# Patient Record
Sex: FEMALE | Race: ASIAN | Employment: FULL TIME | URBAN - METROPOLITAN AREA
[De-identification: names, ages, dates, MRNs, and addresses within clinical notes are randomized per-mention and may not be internally consistent; named-entity substitution may affect disease eponyms.]

---

## 2018-07-02 ENCOUNTER — TRANSFERRED RECORDS (OUTPATIENT)
Dept: HEALTH INFORMATION MANAGEMENT | Facility: CLINIC | Age: 33
End: 2018-07-02

## 2018-07-12 ENCOUNTER — OFFICE VISIT (OUTPATIENT)
Dept: OBGYN | Facility: CLINIC | Age: 33
End: 2018-07-12
Attending: ADVANCED PRACTICE MIDWIFE
Payer: COMMERCIAL

## 2018-07-12 VITALS — SYSTOLIC BLOOD PRESSURE: 109 MMHG | DIASTOLIC BLOOD PRESSURE: 67 MMHG | HEART RATE: 97 BPM | WEIGHT: 151.1 LBS

## 2018-07-12 DIAGNOSIS — Z34.83 ENCOUNTER FOR SUPERVISION OF OTHER NORMAL PREGNANCY IN THIRD TRIMESTER: ICD-10-CM

## 2018-07-12 PROBLEM — Z34.90 SUPERVISION OF NORMAL PREGNANCY: Status: ACTIVE | Noted: 2018-07-12

## 2018-07-12 LAB
ABO + RH BLD: NORMAL
ABO + RH BLD: NORMAL
BASOPHILS # BLD AUTO: 0 10E9/L (ref 0–0.2)
BASOPHILS NFR BLD AUTO: 0 %
BLD GP AB SCN SERPL QL: NORMAL
BLOOD BANK CMNT PATIENT-IMP: NORMAL
DIFFERENTIAL METHOD BLD: ABNORMAL
EOSINOPHIL # BLD AUTO: 0 10E9/L (ref 0–0.7)
EOSINOPHIL NFR BLD AUTO: 0 %
ERYTHROCYTE [DISTWIDTH] IN BLOOD BY AUTOMATED COUNT: 13.5 % (ref 10–15)
HCT VFR BLD AUTO: 34.6 % (ref 35–47)
HGB BLD-MCNC: 11.2 G/DL (ref 11.7–15.7)
LYMPHOCYTES # BLD AUTO: 0.9 10E9/L (ref 0.8–5.3)
LYMPHOCYTES NFR BLD AUTO: 14.8 %
MCH RBC QN AUTO: 28.9 PG (ref 26.5–33)
MCHC RBC AUTO-ENTMCNC: 32.4 G/DL (ref 31.5–36.5)
MCV RBC AUTO: 89 FL (ref 78–100)
MONOCYTES # BLD AUTO: 0.4 10E9/L (ref 0–1.3)
MONOCYTES NFR BLD AUTO: 6.9 %
NEUTROPHILS # BLD AUTO: 4.9 10E9/L (ref 1.6–8.3)
NEUTROPHILS NFR BLD AUTO: 78.3 %
PLATELET # BLD AUTO: 186 10E9/L (ref 150–450)
PLATELET # BLD EST: ABNORMAL 10*3/UL
RBC # BLD AUTO: 3.87 10E12/L (ref 3.8–5.2)
RBC MORPH BLD: NORMAL
SPECIMEN EXP DATE BLD: NORMAL
WBC # BLD AUTO: 6.2 10E9/L (ref 4–11)

## 2018-07-12 PROCEDURE — 36415 COLL VENOUS BLD VENIPUNCTURE: CPT | Performed by: ADVANCED PRACTICE MIDWIFE

## 2018-07-12 PROCEDURE — 87653 STREP B DNA AMP PROBE: CPT | Performed by: ADVANCED PRACTICE MIDWIFE

## 2018-07-12 PROCEDURE — 86901 BLOOD TYPING SEROLOGIC RH(D): CPT | Performed by: ADVANCED PRACTICE MIDWIFE

## 2018-07-12 PROCEDURE — 86850 RBC ANTIBODY SCREEN: CPT | Performed by: ADVANCED PRACTICE MIDWIFE

## 2018-07-12 PROCEDURE — 82306 VITAMIN D 25 HYDROXY: CPT | Performed by: ADVANCED PRACTICE MIDWIFE

## 2018-07-12 PROCEDURE — 86762 RUBELLA ANTIBODY: CPT | Performed by: ADVANCED PRACTICE MIDWIFE

## 2018-07-12 PROCEDURE — 86900 BLOOD TYPING SEROLOGIC ABO: CPT | Performed by: ADVANCED PRACTICE MIDWIFE

## 2018-07-12 PROCEDURE — 85025 COMPLETE CBC W/AUTO DIFF WBC: CPT | Performed by: ADVANCED PRACTICE MIDWIFE

## 2018-07-12 RX ORDER — PRENATAL VIT/IRON FUM/FOLIC AC 27MG-0.8MG
1 TABLET ORAL DAILY
COMMUNITY

## 2018-07-12 NOTE — PROGRESS NOTES
Subjective   33 year old woman presents to clinic for GAETANO.  No LMP recorded. Patient is pregnant.  at 36w3d by Estimated Date of Delivery: Aug 6, 2018 based on 10 week ultrasound. Pt accompanied by , female friend and daughter.     Pt and family recently moved from [place] on . Records available, some in English and some in Finnish. Reviewed records and lab results with patient. Labs (Hgb/Hct/Plts, HIV, Hep B, Hep C, RPR, GCT, pap, chlamydia) within normal limits. Records to be scanned in.  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    Have you traveled during the pregnancy?Yes, If yes explain: moved from [facility]  Have your sexual partner(s) travelled during the pregnancy?Yes, If yes explain: moved from [facility]    - Current Medications  Current Outpatient Prescriptions   Medication Sig Dispense Refill     Prenatal Vit-Fe Fumarate-FA (PRENATAL MULTIVITAMIN PLUS IRON) 27-0.8 MG TABS per tablet Take 1 tablet by mouth daily           - Co-morbids- recurrent pregnancy loss  PERSONAL/SOCIAL HISTORY  Lives with their family.  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  Her job involves light activity .  Additional items: None    Objective  -VS: reviewed and within normal limits   - General appearance: no acute distress, patient is comfortable   NEUROLOGICAL/PSYCHIATRIC   - Orientated x 3   - Mood and affect: normal     EXAM:  /67  Pulse 97  Wt 68.5 kg (151 lb 1.6 oz)   EXAM:  GENERAL:  Pleasant pregnant female, alert, cooperative and well groomed.  SKIN:  Warm and dry, without lesions or rashes  HEAD: Symmetrical features.  MOUTH:  Buccal mucosa pink, moist without lesions.  Teeth in good repair.    NECK:  Thyroid without enlargement and nodules.  Lymph nodes not palpable.   LUNGS:  Clear to auscultation.  BREAST:  deferred   HEART:  RRR without murmur.  ABDOMEN: Soft without masses,  tenderness or organomegaly.  No CVA tenderness.  Uterus palpable at size equal to dates.  No scars noted.. Fetal heart tones present.  MUSCULOSKELETAL:  Full range of motion  EXTREMITIES:  No edema. No significant varicosities.   PELVIC EXAM: Deferred  GC/CHLAMYDIA CULTURE OBTAINED:NO     Assessment/Plan:  33 year old  36w3d weeks of pregnancy with BRANDEN of Aug 6, 2018 by ultrasound.   Outpatient Encounter Prescriptions as of 2018   Medication Sig Dispense Refill     Prenatal Vit-Fe Fumarate-FA (PRENATAL MULTIVITAMIN PLUS IRON) 27-0.8 MG TABS per tablet Take 1 tablet by mouth daily        Orders Placed This Encounter   Procedures     CBC with Platelets Differential     25- OH-Vitamin D     Rubella Antibody IgG Quantitative     MAT FETAL MED CTR REFERRAL-PREGNANCY     ABO/Rh Type and Screen     - Oriented to Practice, types of care, and how to reach a provider.  Pt prefers CNM.  - Patient received 1st trimester new OB education packet complete with aide of The Expectant Family booklet.  - Patient was encouraged to continue prenatal vitamins as tolerated.     - Reviewed use of triage nurse line and contacting the on-call provider after hours for an urgent need such as fever, vagina bleeding, bladder or vaginal infection, rupture of membranes,  or term labor.   - All pt's questions discussed and answered.  Pt verbalized understanding of and agreement to plan of care.     - Pt agreeable to completing ultrasound. MFM referral placed.  - Patient was sent to lab to repeat OB labs including CBC, Vit D, rubella, ABO/Rh.  - GBS collected today   - Needs EOB education reviewed at next visit  - Continue scheduled prenatal care, RTC in 1 week and prn if questions or concerns

## 2018-07-12 NOTE — LETTER
Date:July 16, 2018      Patient was self referred, no letter generated. Do not send.        [facility] Health Information

## 2018-07-12 NOTE — LETTER
2018       RE: Daniel Solares  [address] [facility] [facility] [address]     Dear Colleague,    Thank you for referring your patient, Daniel Solares, to the [facility] at [facility]. Please see a copy of my visit note below.    Subjective   33 year old woman presents to clinic for GAETANO.  No LMP recorded. Patient is pregnant.  at 36w3d by Estimated Date of Delivery: Aug 6, 2018 based on 10 week ultrasound. Pt accompanied by , female friend and daughter.     Pt and family recently moved from [place] on . Records available, some in English and some in Maltese. Reviewed records and lab results with patient. Labs (Hgb/Hct/Plts, HIV, Hep B, Hep C, RPR, GCT, pap, chlamydia) within normal limits. Records to be scanned in.  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    Have you traveled during the pregnancy?Yes, If yes explain: moved from [facility]  Have your sexual partner(s) travelled during the pregnancy?Yes, If yes explain: moved from [facility]    - Current Medications  Current Outpatient Prescriptions   Medication Sig Dispense Refill     Prenatal Vit-Fe Fumarate-FA (PRENATAL MULTIVITAMIN PLUS IRON) 27-0.8 MG TABS per tablet Take 1 tablet by mouth daily           - Co-morbids- recurrent pregnancy loss  PERSONAL/SOCIAL HISTORY  Lives with their family.  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  Her job involves light activity .  Additional items: None    Objective  -VS: reviewed and within normal limits   - General appearance: no acute distress, patient is comfortable   NEUROLOGICAL/PSYCHIATRIC   - Orientated x 3   - Mood and affect: normal     EXAM:  /67  Pulse 97  Wt 68.5 kg (151 lb 1.6 oz)   EXAM:  GENERAL:  Pleasant pregnant female, alert, cooperative and well groomed.  SKIN:  Warm and dry, without lesions or rashes  HEAD: Symmetrical  features.  MOUTH:  Buccal mucosa pink, moist without lesions.  Teeth in good repair.    NECK:  Thyroid without enlargement and nodules.  Lymph nodes not palpable.   LUNGS:  Clear to auscultation.  BREAST:  deferred   HEART:  RRR without murmur.  ABDOMEN: Soft without masses, tenderness or organomegaly.  No CVA tenderness.  Uterus palpable at size equal to dates.  No scars noted.. Fetal heart tones present.  MUSCULOSKELETAL:  Full range of motion  EXTREMITIES:  No edema. No significant varicosities.   PELVIC EXAM: Deferred  GC/CHLAMYDIA CULTURE OBTAINED:NO     Assessment/Plan:  33 year old  36w3d weeks of pregnancy with BRANDEN of Aug 6, 2018 by ultrasound.   Outpatient Encounter Prescriptions as of 2018   Medication Sig Dispense Refill     Prenatal Vit-Fe Fumarate-FA (PRENATAL MULTIVITAMIN PLUS IRON) 27-0.8 MG TABS per tablet Take 1 tablet by mouth daily        Orders Placed This Encounter   Procedures     CBC with Platelets Differential     25- OH-Vitamin D     Rubella Antibody IgG Quantitative     MAT FETAL MED CTR REFERRAL-PREGNANCY     ABO/Rh Type and Screen     - Oriented to Practice, types of care, and how to reach a provider.  Pt prefers CNM.  - Patient received 1st trimester new OB education packet complete with aide of The Expectant Family booklet.  - Patient was encouraged to continue prenatal vitamins as tolerated.     - Reviewed use of triage nurse line and contacting the on-call provider after hours for an urgent need such as fever, vagina bleeding, bladder or vaginal infection, rupture of membranes,  or term labor.   - All pt's questions discussed and answered.  Pt verbalized understanding of and agreement to plan of care.     - Pt agreeable to completing ultrasound. M referral placed.  - Patient was sent to lab to repeat OB labs including CBC, Vit D, rubella, ABO/Rh.  - GBS collected today   - Needs EOB education reviewed at next visit  - Continue scheduled prenatal care, RTC in 1  week and prn if questions or concerns    Again, thank you for allowing me to participate in the care of your patient.      Sincerely,    MARTITA Taylor CNM

## 2018-07-12 NOTE — MR AVS SNAPSHOT
After Visit Summary   7/12/2018    Daniel Solares    MRN: [id number]           Patient Information     Date Of Birth          1985        Visit Information        Provider Department      7/12/2018 10:30 AM Geoff Soto APRN CNM; LANGUAGE [facility]        Today's Diagnoses     Encounter for supervision of other normal pregnancy in third trimester           Follow-ups after your visit        Additional Services     MAT FETAL MED CTR REFERRAL-PREGNANCY       There is no height or weight on file to calculate BMI.    >> Patient may proceed with recommendations for further testing as directed by the Maternal Fetal Medicine Specialist >>    >> If requesting Fetal Echo: MFM will determine appropriate location for exam due to indication.    >> If requesting Lung Maturity Amnio:  If results indicate fetal lung maturity, induction or C/S is recommended within 36 hours.  Please schedule accordingly.     Dear Patient:   Please be aware that coverage of these services is subject to the terms and limitations of your health insurance plan.  Call member services at your health plan with any benefit or coverage questions.      Please bring the following to your appointment:    >>  Any x-rays, CTs or MRIs which have been performed.  Contact the facility where they were done to arrange for  prior to your scheduled appointment.  Any new CT, MRI or other procedures ordered by your specialist must be performed at a [facility] facility or coordinated by your clinic's referral office.  >>  List of current medications   >>  This referral request   >>  Any documents/labs given to you for this referral                  Follow-up notes from your care team     Return in about 1 week (around 7/19/2018) for EOB Visit.      Your next 10 appointments already scheduled     Jul 16, 2018  2:15 PM CDT   NATHAN US COMP with [id number]   [facility]    [address]  [place] [address] [phone]           Wear comfortable clothes and leave your valuables at home.            2018  2:45 PM CDT   Radiology MD with SH NATHAN OBRIEN   [facility] - [facility]    [address]  [place] [address] [phone]           Please arrive at the time given for your first appointment. This visit is used internally to schedule the physician's time during your ultrasound.              Future tests that were ordered for you today     Open Future Orders        Priority Expected Expires Ordered    [facility] Comprehensive Single Routine  2019            Who to contact     Please call your clinic at [phone] to:    Ask questions about your health    Make or cancel appointments    Discuss your medicines    Learn about your test results    Speak to your doctor            Additional Information About Your Visit        MyChart Information     [employer] is an electronic gateway that provides easy, online access to your medical records. With [employer], you can request a clinic appointment, read your test results, renew a prescription or communicate with your care team.     To sign up for [employer] visit the website at [email or web address]   You will be asked to enter the access code listed below, as well as some personal information. Please follow the directions to create your username and password.     Your access code is: [email or web address]  Expires: 10/9/2018  6:30 AM     Your access code will  in 90 days. If you need help or a new code, please contact your [facility] or call [phone] for assistance.        Care EveryWhere ID     This is your Care EveryWhere ID. This could be used by other organizations to access your [place] medical records  [id number]        Your Vitals Were     Pulse                   97             Blood Pressure from Last 3 Encounters:   07/12/18 109/67    Weight from Last 3 Encounters:   07/12/18 68.5 kg (151 lb 1.6 oz)              We Performed the Following     25- OH-Vitamin D     ABO/Rh Type and Screen     CBC with Platelets Differential     Group B strep PCR     MAT FETAL MED CTR REFERRAL-PREGNANCY     Rubella Antibody IgG Quantitative        Primary Care Provider    None Specified       No primary provider on file.        Equal Access to Services     [facility]: Hadii arti schwartzo Jose, waaxda melvin, qaybta kaalmada sophia, nirmal fariaenzomadhav tijerina . So [facility] [phone].    ATENCIÓN: Si habla handy, tiene a renee disposición servicios gratuitos de asistencia lingüística. Llame al [phone].    We comply with applicable federal civil rights laws and [place] laws. We do not discriminate on the basis of race, color, national origin, age, disability, sex, sexual orientation, or gender identity.            Thank you!     Thank you for choosing [facility]  for your care. Our goal is always to provide you with excellent care. Hearing back from our patients is one way we can continue to improve our services. Please take a few minutes to complete the written survey that you may receive in the mail after your visit with us. Thank you!             Your Updated Medication List - Protect others around you: Learn how to safely use, store and throw away your medicines at www.disposemymeds.org.          This list is accurate as of 7/12/18 11:59 PM.  Always use your most recent med list.                   Brand Name Dispense Instructions for use Diagnosis    prenatal multivitamin plus iron 27-0.8 MG Tabs per tablet      Take 1 tablet by mouth daily

## 2018-07-13 LAB
GP B STREP DNA SPEC QL NAA+PROBE: NEGATIVE
RUBV IGG SERPL IA-ACNC: 11 IU/ML
SPECIMEN SOURCE: NORMAL

## 2018-07-16 ENCOUNTER — OFFICE VISIT (OUTPATIENT)
Dept: MATERNAL FETAL MEDICINE | Facility: CLINIC | Age: 33
End: 2018-07-16
Attending: ADVANCED PRACTICE MIDWIFE
Payer: COMMERCIAL

## 2018-07-16 ENCOUNTER — PRE VISIT (OUTPATIENT)
Dept: MATERNAL FETAL MEDICINE | Facility: CLINIC | Age: 33
End: 2018-07-16

## 2018-07-16 ENCOUNTER — HOSPITAL ENCOUNTER (OUTPATIENT)
Dept: ULTRASOUND IMAGING | Facility: CLINIC | Age: 33
Discharge: HOME | End: 2018-07-16
Attending: ADVANCED PRACTICE MIDWIFE | Admitting: ADVANCED PRACTICE MIDWIFE
Payer: COMMERCIAL

## 2018-07-16 DIAGNOSIS — O26.90 PREGNANCY RELATED CONDITION, ANTEPARTUM: ICD-10-CM

## 2018-07-16 DIAGNOSIS — O35.9XX0 SUSPECTED FETAL ABNORMALITY AFFECTING MANAGEMENT OF MOTHER, ANTEPARTUM, SINGLE OR UNSPECIFIED FETUS: Primary | ICD-10-CM

## 2018-07-16 LAB — DEPRECATED CALCIDIOL+CALCIFEROL SERPL-MC: 17 UG/L (ref 20–75)

## 2018-07-16 PROCEDURE — 76811 OB US DETAILED SNGL FETUS: CPT

## 2018-07-16 NOTE — PROGRESS NOTES
Please see ultrasound report under imaging tab for details on ultrasound performed today.    Cheyenne Freeman MD  , OB/GYN  Maternal-Fetal Medicine  [email or web address]  [phone] (Academic office)  [phone] (Pager)

## 2018-07-16 NOTE — MR AVS SNAPSHOT
After Visit Summary   7/16/2018    Daniel Solares    MRN: [id number]           Patient Information     Date Of Birth          1985        Visit Information        Provider Department      7/16/2018 2:45 PM Cheyenne Freeman MD [facility]alth Maternal Fetal Medicine  [place]        Today's Diagnoses     Suspected fetal abnormality affecting management of mother, antepartum, single or unspecified fetus    -  1       Follow-ups after your visit        Your next 10 appointments already scheduled     Jul 18, 2018 10:15 AM CDT   Ech Fetal Complete* with Urmfmusfet   [facility] Maternal Fetal Medicine - [place] ([facility])    [address]  [facility] [address]   [phone]           Please check in at the [place] [facility],  [address], [place] [address].  Park in the Red Ramp for easiest access.  Skyway access to the [place] [employer] [facility] is located on the second level of the ramp.  Please call [phone] if you have questions.            Jul 25, 2018  2:00 PM CDT   Return Obstetrics Extended with MARTITA Ware CNM   [facility] ([facility])    [place] [facility] [address]  [address]  [facility] [id number] [phone]              Future tests that were ordered for you today     Open Future Orders        Priority Expected Expires Ordered    Echo Fetal Complete-Peds Cardiology Routine  7/16/2019 7/16/2018            Who to contact     If you have questions or need follow up information about today's clinic visit or your schedule please contact [facility] directly at [phone].  Normal or non-critical lab and imaging results will be communicated to you by MyChart, letter or phone within 4 business days after the clinic has received the results. If you do not hear from us within 7 days, please contact the clinic through  "MyChart or phone. If you have a critical or abnormal lab result, we will notify you by phone as soon as possible.  Submit refill requests through [employer] or call your pharmacy and they will forward the refill request to us. Please allow 3 business days for your refill to be completed.          Additional Information About Your Visit        Letsmakehart Information     [employer] lets you send messages to your doctor, view your test results, renew your prescriptions, schedule appointments and more. To sign up, go to [email or web address] . Click on \"Log in\" on the left side of the screen, which will take you to the Welcome page. Then click on \"Sign up Now\" on the right side of the page.     You will be asked to enter the access code listed below, as well as some personal information. Please follow the directions to create your username and password.     Your access code is: [email or web address]  Expires: 10/9/2018  6:30 AM     Your access code will  in 90 days. If you need help or a new code, please call your [place] clinic or [phone].        Care EveryWhere ID     This is your Care EveryWhere ID. This could be used by other organizations to access your [place] medical records  [id number]         Blood Pressure from Last 3 Encounters:   18 109/67    Weight from Last 3 Encounters:   18 68.5 kg (151 lb 1.6 oz)               Primary Care Provider    None Specified       No primary provider on file.        Equal Access to Services     [facility] : Hadii arti schwartzo Sonahomy, waaxda luqadaha, qaybta kaalmada kitda, nirmal tijerina . So [facility] [phone].    ATENCIÓN: Si habla español, tiene a renee disposición servicios gratuitos de asistencia lingüística. Llame al [phone].    We comply with applicable federal civil rights laws and [place] laws. We do not discriminate on the basis of race, color, national origin, age, disability, sex, sexual orientation, or gender " identity.            Thank you!     Thank you for choosing [facility]  for your care. Our goal is always to provide you with excellent care. Hearing back from our patients is one way we can continue to improve our services. Please take a few minutes to complete the written survey that you may receive in the mail after your visit with us. Thank you!             Your Updated Medication List - Protect others around you: Learn how to safely use, store and throw away your medicines at www.disposemymeds.org.          This list is accurate as of 7/16/18  3:27 PM.  Always use your most recent med list.                   Brand Name Dispense Instructions for use Diagnosis    prenatal multivitamin plus iron 27-0.8 MG Tabs per tablet      Take 1 tablet by mouth daily

## 2018-07-18 ENCOUNTER — HOSPITAL ENCOUNTER (OUTPATIENT)
Dept: CARDIOLOGY | Facility: CLINIC | Age: 33
Discharge: HOME | End: 2018-07-18
Attending: OBSTETRICS & GYNECOLOGY | Admitting: OBSTETRICS & GYNECOLOGY
Payer: COMMERCIAL

## 2018-07-18 DIAGNOSIS — O35.9XX0 SUSPECTED FETAL ABNORMALITY AFFECTING MANAGEMENT OF MOTHER, ANTEPARTUM, SINGLE OR UNSPECIFIED FETUS: ICD-10-CM

## 2018-07-18 PROBLEM — E55.9 VITAMIN D DEFICIENCY: Status: ACTIVE | Noted: 2018-07-18

## 2018-07-18 PROCEDURE — 76827 ECHO EXAM OF FETAL HEART: CPT

## 2018-07-18 NOTE — PROGRESS NOTES
Fetal Cardiology Consultation    Patient:  Daniel Solares MRN:  [id number]   YOB: 1985 Age:  33 year old   Date of Visit:  2018 PCP:  No primary care provider on file.   BRANDEN: 2018, Alternate BRANDEN Entry EGA: 37w2d weeks     Dear Dr. Freeman:    I had the pleasure of seeing Daniel Solares at the [facility] Fetal Echocardiography Laboratory in [place] on 2018 in consultation for fetal echocardiography results. She presented today accompanied by her partner; today's visit was facilitated through an . 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    Likely normal fetal echocardiogram. Normal fetal cardiac anatomy. Normal fetal intracardiac connections. Normal right and left ventricular size and function. The left common carotid appears to arise from the innominate artery, with normal origin of the left subclavian artery. The aortic isthmus measures borderline-hypoplastic (3.6-4mm), with normal right heart size for gestational age, no AV valve regurgitation, normal prograde arch flow profile.    I reviewed and interpreted the fetal echocardiogram today. I discussed the normal results with Ms. Solares and her partner with an . While these results are normal, it is important to note that fetal echocardiography cannot exclude small atrial or ventricular septal defects, persistent ductus arteriosus, mild coarctation of the aorta, partial anomalous pulmonary venous return, minor anatomic valve anomalies, or coronary artery anomalies.     I discussed results of the study and visit with you.  -- No additional fetal echocardiograms are recommended for this pregnancy.  -- A post- transthoracic echocardiogram is recommended to reevaluate the aortic arch, within 24H of delivery.    Thank you for allowing me to participate in Ms. Solares's care. Please don't hesitate to contact me or  the Fetal Cardiology team at [facility] with any questions or concerns.     I spent a total of 20 minutes face-to-face with MsEdison Sujatha during today's office visit. Over 50% of this time was spent counseling the patient and/or coordinating care regarding the fetal echocardiography results.     Ji Burns  Pediatric Cardiology  [facility]  Phone [phone]

## 2018-07-25 ENCOUNTER — OFFICE VISIT (OUTPATIENT)
Dept: OBGYN | Facility: CLINIC | Age: 33
End: 2018-07-25
Attending: ADVANCED PRACTICE MIDWIFE
Payer: COMMERCIAL

## 2018-07-25 VITALS — HEART RATE: 98 BPM | DIASTOLIC BLOOD PRESSURE: 66 MMHG | WEIGHT: 155.4 LBS | SYSTOLIC BLOOD PRESSURE: 109 MMHG

## 2018-07-25 DIAGNOSIS — Z60.3 LANGUAGE BARRIER: ICD-10-CM

## 2018-07-25 DIAGNOSIS — E55.9 VITAMIN D DEFICIENCY: ICD-10-CM

## 2018-07-25 DIAGNOSIS — Z34.83 ENCOUNTER FOR SUPERVISION OF OTHER NORMAL PREGNANCY IN THIRD TRIMESTER: Primary | ICD-10-CM

## 2018-07-25 DIAGNOSIS — Z75.8 LANGUAGE BARRIER: ICD-10-CM

## 2018-07-25 PROCEDURE — 90471 IMMUNIZATION ADMIN: CPT | Mod: ZF

## 2018-07-25 PROCEDURE — G0463 HOSPITAL OUTPT CLINIC VISIT: HCPCS | Mod: 25

## 2018-07-25 PROCEDURE — 90715 TDAP VACCINE 7 YRS/> IM: CPT | Mod: ZF

## 2018-07-25 PROCEDURE — 25000128 H RX IP 250 OP 636: Mod: ZF

## 2018-07-25 SDOH — SOCIAL STABILITY - SOCIAL INSECURITY: ACCULTURATION DIFFICULTY: Z60.3

## 2018-07-25 ASSESSMENT — ANXIETY QUESTIONNAIRES
2. NOT BEING ABLE TO STOP OR CONTROL WORRYING: NOT AT ALL
6. BECOMING EASILY ANNOYED OR IRRITABLE: NOT AT ALL
GAD7 TOTAL SCORE: 0
3. WORRYING TOO MUCH ABOUT DIFFERENT THINGS: NOT AT ALL
5. BEING SO RESTLESS THAT IT IS HARD TO SIT STILL: NOT AT ALL
7. FEELING AFRAID AS IF SOMETHING AWFUL MIGHT HAPPEN: NOT AT ALL
1. FEELING NERVOUS, ANXIOUS, OR ON EDGE: NOT AT ALL

## 2018-07-25 ASSESSMENT — PAIN SCALES - GENERAL: PAINLEVEL: NO PAIN (0)

## 2018-07-25 ASSESSMENT — PATIENT HEALTH QUESTIONNAIRE - PHQ9: 5. POOR APPETITE OR OVEREATING: NOT AT ALL

## 2018-07-25 NOTE — PROGRESS NOTES
Subjective:  33 year old, , 38w2d, presents for prenatal visit  Denies leaking of fluid or vaginal bleeding. Occ ctxs. Reports + fetal movement.  The patient presents with the following concerns: feeling down/anxious about labor   - Completed birthplace tour  Vitals:  /66  Pulse 98  Wt 70.5 kg (155 lb 6.4 oz)    Education completed today includes breast feeding, [facility] hand out , contraception, counting movements, signs of pre-term labor, when to present to birthplace, post partum depression, GBS, getting enough iron and labor induction.  Birth preferences reviewed: Un-Medicated, did not use medication with previous 2 children. Discussed ambulation, position changes, hydrotherapy, and nitrous oxide.  Labor support:     Feeding plans: Breastfeeding  Contraception planned:  none  The following labs were ordered today: already completed  [place] birth consent form was not given.    Blood type:   ABO   Date Value Ref Range Status   2018 A  Final     RH(D)   Date Value Ref Range Status   2018 Pos  Final     Antibody Screen   Date Value Ref Range Status   2018 Neg  Final   Rhogam was not given.  TDAP was given.    A/P:  Encounter Diagnoses   Name Primary?     Encounter for supervision of other normal pregnancy in third trimester Yes     Vitamin D deficiency      Orders Placed This Encounter   Medications     cholecalciferol (VITAMIN D3) 5000 units CAPS capsule     Sig: Take 1 capsule (5,000 Units) by mouth daily Take one capsule daily.     Dispense:  90 capsule     Refill:  3     Discussed lab results from last visit  Continue scheduled prenatal care, RTC in 1 week

## 2018-07-25 NOTE — LETTER
2018       RE: Daniel Solares  [address] [facility] [address]     Dear Colleague,    Thank you for referring your patient, Daniel Solares, to the [facility] at [facility]. Please see a copy of my visit note below.    Subjective:  33 year old, , 38w2d, presents for prenatal visit  Denies leaking of fluid or vaginal bleeding. Occ ctxs. Reports + fetal movement.  The patient presents with the following concerns: feeling down/anxious about labor   - Completed birthplace tour  Vitals:  /66  Pulse 98  Wt 70.5 kg (155 lb 6.4 oz)    Education completed today includes breast feeding, [facility] hand out , contraception, counting movements, signs of pre-term labor, when to present to birthplace, post partum depression, GBS, getting enough iron and labor induction.  Birth preferences reviewed: Un-Medicated, did not use medication with previous 2 children. Discussed ambulation, position changes, hydrotherapy, and nitrous oxide.  Labor support:    [place] Feeding plans: Breastfeeding  Contraception planned:  none  The following labs were ordered today: already completed  Water birth consent form was not given.    Blood type:   ABO   Date Value Ref Range Status   2018 A  Final     RH(D)   Date Value Ref Range Status   2018 Pos  Final     Antibody Screen   Date Value Ref Range Status   2018 Neg  Final   Rhogam was not given.  TDAP was given.    A/P:  Encounter Diagnoses   Name Primary?     Encounter for supervision of other normal pregnancy in third trimester Yes     Vitamin D deficiency      Orders Placed This Encounter   Medications     cholecalciferol (VITAMIN D3) 5000 units CAPS capsule     Sig: Take 1 capsule (5,000 Units) by mouth daily Take one capsule daily.     Dispense:  90 capsule     Refill:  3     Discussed lab results from last visit  Continue scheduled prenatal care, RTC in 1 week    Again, thank you for allowing me to  participate in the care of your patient.      Sincerely,    MATRITA Taylor CNM

## 2018-07-25 NOTE — LETTER
Date:July 26, 2018      Patient was self referred, no letter generated. Do not send.        [facility] Health Information

## 2018-07-26 ASSESSMENT — PATIENT HEALTH QUESTIONNAIRE - PHQ9: SUM OF ALL RESPONSES TO PHQ QUESTIONS 1-9: 0

## 2018-07-26 ASSESSMENT — ANXIETY QUESTIONNAIRES: GAD7 TOTAL SCORE: 0

## 2018-07-31 ENCOUNTER — HOSPITAL ENCOUNTER (INPATIENT)
Facility: CLINIC | Age: 33
LOS: 2 days | Discharge: HOME HEALTH | End: 2018-08-02
Attending: MIDWIFE | Admitting: MIDWIFE
Payer: COMMERCIAL

## 2018-07-31 ENCOUNTER — TELEPHONE (OUTPATIENT)
Dept: OBGYN | Facility: CLINIC | Age: 33
End: 2018-07-31

## 2018-07-31 ENCOUNTER — OFFICE VISIT (OUTPATIENT)
Dept: INTERPRETER SERVICES | Facility: CLINIC | Age: 33
End: 2018-07-31
Payer: COMMERCIAL

## 2018-07-31 LAB
ABO + RH BLD: NORMAL
ABO + RH BLD: NORMAL
BASOPHILS # BLD AUTO: 0 10E9/L (ref 0–0.2)
BASOPHILS NFR BLD AUTO: 0.1 %
BLD GP AB SCN SERPL QL: NORMAL
BLOOD BANK CMNT PATIENT-IMP: NORMAL
DIFFERENTIAL METHOD BLD: ABNORMAL
EOSINOPHIL # BLD AUTO: 0 10E9/L (ref 0–0.7)
EOSINOPHIL NFR BLD AUTO: 0.1 %
ERYTHROCYTE [DISTWIDTH] IN BLOOD BY AUTOMATED COUNT: 14 % (ref 10–15)
HCT VFR BLD AUTO: 36.1 % (ref 35–47)
HGB BLD-MCNC: 11.4 G/DL (ref 11.7–15.7)
IMM GRANULOCYTES # BLD: 0 10E9/L (ref 0–0.4)
IMM GRANULOCYTES NFR BLD: 0.3 %
LYMPHOCYTES # BLD AUTO: 1.2 10E9/L (ref 0.8–5.3)
LYMPHOCYTES NFR BLD AUTO: 17.9 %
MCH RBC QN AUTO: 28.1 PG (ref 26.5–33)
MCHC RBC AUTO-ENTMCNC: 31.6 G/DL (ref 31.5–36.5)
MCV RBC AUTO: 89 FL (ref 78–100)
MONOCYTES # BLD AUTO: 0.4 10E9/L (ref 0–1.3)
MONOCYTES NFR BLD AUTO: 5.5 %
NEUTROPHILS # BLD AUTO: 5.1 10E9/L (ref 1.6–8.3)
NEUTROPHILS NFR BLD AUTO: 76.1 %
NRBC # BLD AUTO: 0 10*3/UL
NRBC BLD AUTO-RTO: 0 /100
PLATELET # BLD AUTO: 181 10E9/L (ref 150–450)
RBC # BLD AUTO: 4.05 10E12/L (ref 3.8–5.2)
SPECIMEN EXP DATE BLD: NORMAL
WBC # BLD AUTO: 6.7 10E9/L (ref 4–11)

## 2018-07-31 PROCEDURE — 12000030 ZZH R&B OB INTERMEDIATE UMMC

## 2018-07-31 PROCEDURE — 86900 BLOOD TYPING SEROLOGIC ABO: CPT | Performed by: MIDWIFE

## 2018-07-31 PROCEDURE — 86901 BLOOD TYPING SEROLOGIC RH(D): CPT | Performed by: MIDWIFE

## 2018-07-31 PROCEDURE — 85025 COMPLETE CBC W/AUTO DIFF WBC: CPT | Performed by: MIDWIFE

## 2018-07-31 PROCEDURE — G0463 HOSPITAL OUTPT CLINIC VISIT: HCPCS

## 2018-07-31 PROCEDURE — 86850 RBC ANTIBODY SCREEN: CPT | Performed by: MIDWIFE

## 2018-07-31 PROCEDURE — 25000125 ZZHC RX 250

## 2018-07-31 PROCEDURE — 72200001 ZZH LABOR CARE VAGINAL DELIVERY SINGLE

## 2018-07-31 PROCEDURE — 25000125 ZZHC RX 250: Performed by: MIDWIFE

## 2018-07-31 PROCEDURE — 86780 TREPONEMA PALLIDUM: CPT | Performed by: MIDWIFE

## 2018-07-31 PROCEDURE — 25000132 ZZH RX MED GY IP 250 OP 250 PS 637: Performed by: MIDWIFE

## 2018-07-31 PROCEDURE — T1013 SIGN LANG/ORAL INTERPRETER: HCPCS | Mod: U3

## 2018-07-31 RX ORDER — NALOXONE HYDROCHLORIDE 0.4 MG/ML
.1-.4 INJECTION, SOLUTION INTRAMUSCULAR; INTRAVENOUS; SUBCUTANEOUS
Status: DISCONTINUED | OUTPATIENT
Start: 2018-07-31 | End: 2018-07-31

## 2018-07-31 RX ORDER — IBUPROFEN 800 MG/1
800 TABLET, FILM COATED ORAL
Status: DISCONTINUED | OUTPATIENT
Start: 2018-07-31 | End: 2018-07-31

## 2018-07-31 RX ORDER — OXYTOCIN 10 [USP'U]/ML
10 INJECTION, SOLUTION INTRAMUSCULAR; INTRAVENOUS
Status: DISCONTINUED | OUTPATIENT
Start: 2018-07-31 | End: 2018-08-02 | Stop reason: HOSPADM

## 2018-07-31 RX ORDER — OXYTOCIN/0.9 % SODIUM CHLORIDE 30/500 ML
PLASTIC BAG, INJECTION (ML) INTRAVENOUS
Status: COMPLETED
Start: 2018-07-31 | End: 2018-07-31

## 2018-07-31 RX ORDER — OXYTOCIN 10 [USP'U]/ML
10 INJECTION, SOLUTION INTRAMUSCULAR; INTRAVENOUS
Status: DISCONTINUED | OUTPATIENT
Start: 2018-07-31 | End: 2018-07-31

## 2018-07-31 RX ORDER — BISACODYL 10 MG
10 SUPPOSITORY, RECTAL RECTAL DAILY PRN
Status: DISCONTINUED | OUTPATIENT
Start: 2018-08-02 | End: 2018-08-02 | Stop reason: HOSPADM

## 2018-07-31 RX ORDER — ACETAMINOPHEN 325 MG/1
650 TABLET ORAL EVERY 4 HOURS PRN
Status: DISCONTINUED | OUTPATIENT
Start: 2018-07-31 | End: 2018-07-31

## 2018-07-31 RX ORDER — LANOLIN 100 %
OINTMENT (GRAM) TOPICAL
Status: DISCONTINUED | OUTPATIENT
Start: 2018-07-31 | End: 2018-08-02 | Stop reason: HOSPADM

## 2018-07-31 RX ORDER — AMOXICILLIN 250 MG
2 CAPSULE ORAL 2 TIMES DAILY
Status: DISCONTINUED | OUTPATIENT
Start: 2018-07-31 | End: 2018-08-02 | Stop reason: HOSPADM

## 2018-07-31 RX ORDER — OXYTOCIN/0.9 % SODIUM CHLORIDE 30/500 ML
100 PLASTIC BAG, INJECTION (ML) INTRAVENOUS CONTINUOUS
Status: DISCONTINUED | OUTPATIENT
Start: 2018-07-31 | End: 2018-08-02 | Stop reason: HOSPADM

## 2018-07-31 RX ORDER — NALOXONE HYDROCHLORIDE 0.4 MG/ML
.1-.4 INJECTION, SOLUTION INTRAMUSCULAR; INTRAVENOUS; SUBCUTANEOUS
Status: DISCONTINUED | OUTPATIENT
Start: 2018-07-31 | End: 2018-08-02 | Stop reason: HOSPADM

## 2018-07-31 RX ORDER — METHYLERGONOVINE MALEATE 0.2 MG/ML
200 INJECTION INTRAVENOUS
Status: DISCONTINUED | OUTPATIENT
Start: 2018-07-31 | End: 2018-07-31

## 2018-07-31 RX ORDER — AMOXICILLIN 250 MG
1 CAPSULE ORAL 2 TIMES DAILY
Status: DISCONTINUED | OUTPATIENT
Start: 2018-07-31 | End: 2018-08-02 | Stop reason: HOSPADM

## 2018-07-31 RX ORDER — ONDANSETRON 2 MG/ML
4 INJECTION INTRAMUSCULAR; INTRAVENOUS EVERY 6 HOURS PRN
Status: DISCONTINUED | OUTPATIENT
Start: 2018-07-31 | End: 2018-07-31

## 2018-07-31 RX ORDER — SODIUM CHLORIDE, SODIUM LACTATE, POTASSIUM CHLORIDE, CALCIUM CHLORIDE 600; 310; 30; 20 MG/100ML; MG/100ML; MG/100ML; MG/100ML
INJECTION, SOLUTION INTRAVENOUS CONTINUOUS
Status: DISCONTINUED | OUTPATIENT
Start: 2018-07-31 | End: 2018-07-31

## 2018-07-31 RX ORDER — OXYTOCIN/0.9 % SODIUM CHLORIDE 30/500 ML
100-340 PLASTIC BAG, INJECTION (ML) INTRAVENOUS CONTINUOUS PRN
Status: DISCONTINUED | OUTPATIENT
Start: 2018-07-31 | End: 2018-07-31

## 2018-07-31 RX ORDER — OXYTOCIN 10 [USP'U]/ML
INJECTION, SOLUTION INTRAMUSCULAR; INTRAVENOUS
Status: DISCONTINUED
Start: 2018-07-31 | End: 2018-07-31 | Stop reason: WASHOUT

## 2018-07-31 RX ORDER — MISOPROSTOL 200 UG/1
400 TABLET ORAL
Status: DISCONTINUED | OUTPATIENT
Start: 2018-07-31 | End: 2018-08-02 | Stop reason: HOSPADM

## 2018-07-31 RX ORDER — HYDROCORTISONE 2.5 %
CREAM (GRAM) TOPICAL 3 TIMES DAILY PRN
Status: DISCONTINUED | OUTPATIENT
Start: 2018-07-31 | End: 2018-08-02 | Stop reason: HOSPADM

## 2018-07-31 RX ORDER — ACETAMINOPHEN 325 MG/1
650 TABLET ORAL EVERY 4 HOURS PRN
Status: DISCONTINUED | OUTPATIENT
Start: 2018-07-31 | End: 2018-08-02 | Stop reason: HOSPADM

## 2018-07-31 RX ORDER — IBUPROFEN 800 MG/1
800 TABLET, FILM COATED ORAL EVERY 6 HOURS PRN
Status: DISCONTINUED | OUTPATIENT
Start: 2018-07-31 | End: 2018-08-02 | Stop reason: HOSPADM

## 2018-07-31 RX ORDER — FENTANYL CITRATE 50 UG/ML
50-100 INJECTION, SOLUTION INTRAMUSCULAR; INTRAVENOUS
Status: DISCONTINUED | OUTPATIENT
Start: 2018-07-31 | End: 2018-07-31

## 2018-07-31 RX ORDER — OXYTOCIN/0.9 % SODIUM CHLORIDE 30/500 ML
340 PLASTIC BAG, INJECTION (ML) INTRAVENOUS CONTINUOUS PRN
Status: DISCONTINUED | OUTPATIENT
Start: 2018-07-31 | End: 2018-08-02 | Stop reason: HOSPADM

## 2018-07-31 RX ORDER — CARBOPROST TROMETHAMINE 250 UG/ML
250 INJECTION, SOLUTION INTRAMUSCULAR
Status: DISCONTINUED | OUTPATIENT
Start: 2018-07-31 | End: 2018-07-31

## 2018-07-31 RX ORDER — MISOPROSTOL 200 UG/1
TABLET ORAL
Status: DISCONTINUED
Start: 2018-07-31 | End: 2018-07-31 | Stop reason: HOSPADM

## 2018-07-31 RX ORDER — OXYCODONE AND ACETAMINOPHEN 5; 325 MG/1; MG/1
1 TABLET ORAL
Status: DISCONTINUED | OUTPATIENT
Start: 2018-07-31 | End: 2018-07-31

## 2018-07-31 RX ADMIN — IBUPROFEN 800 MG: 800 TABLET ORAL at 13:58

## 2018-07-31 RX ADMIN — Medication 340 ML/HR: at 13:00

## 2018-07-31 RX ADMIN — LIDOCAINE HYDROCHLORIDE 5 ML: 10 INJECTION, SOLUTION EPIDURAL; INFILTRATION; INTRACAUDAL; PERINEURAL at 13:05

## 2018-07-31 RX ADMIN — OXYTOCIN-SODIUM CHLORIDE 0.9% IV SOLN 30 UNIT/500ML 340 ML/HR: 30-0.9/5 SOLUTION at 13:00

## 2018-07-31 NOTE — PROGRESS NOTES
"Labor progress note    S:  Pt is very uncomfortable  Noting pressure      O:  Blood pressure 103/54, temperature 98.3  F (36.8  C), resp. rate 18, height 1.67 m (5' 5.75\"), weight 68 kg (150 lb).  General appearance: uncomfortable with contractions.  Contractions: Every 2-3 minutes. 70 seconds duration.  Palpate: strong.  FHT: Baseline 145 with mod variability. Accelerations are present. no decelerations present.  ROM: not ruptured.   Pelvic exam: 6/ 80%/ Mid/ soft/ -1    Pitocin- none,  Antibiotics- none  # Pain Assessment:   - Daniel is experiencing pain due to labor. Pain management was discussed with Daniel and her spouse and the plan was created in a collaborative fashion.  Daniel's response to the current recommendations: engaged  - Please see the plan for pain management as documented above      A:  IUP @ 39w1d active labor   Fetal Heart rate tracing Category category one  GBS- negative  Patient Active Problem List   Diagnosis     Supervision of normal pregnancy     Vitamin D deficiency     Language barrier     Labor and delivery indication for care or intervention         P:  comfort measures prn   Pain medication nitrous   Anticipate   MD consultant on call / available prn     Edwina Marrufo  "

## 2018-07-31 NOTE — PROVIDER NOTIFICATION
07/31/18 1236   Provider Notification   Provider Name/Title JOEY Marrufo   Method of Notification At Bedside   Request Evaluate in Person   Notification Reason SVE   CNM present to evaluate in person. SVE 6/80/-1. Pt. Requests Nitrous oxide for pain management. RN and CNM continuously at bedside.

## 2018-07-31 NOTE — IP AVS SNAPSHOT
MRN:[id number]                      After Visit Summary   7/31/2018    Daniel Solares    MRN: [id number]           Thank you!     Thank you for choosing [facility] for your care. Our goal is always to provide you with excellent care. Hearing back from our patients is one way we can continue to improve our services. Please take a few minutes to complete the written survey that you may receive in the mail after you visit with us. Thank you!        Patient Information     Date Of Birth          1985        About your hospital stay     You were admitted on:  July 31, 2018 You last received care in the:  [facility]    You were discharged on:  August 2, 2018        Reason for your hospital stay       Maternity care                  Who to Call     For medical emergencies, please call 911.  For non-urgent questions about your medical care, please call your primary care provider or clinic, None          Attending Provider     Provider Specialty    Edwina Marrufo APRN CNM [facility]       Primary Care Provider Fax #    Physician No Ref-Primary [phone]      After Care Instructions     Activity       Review discharge instructions            Diet       Resume previous diet            Discharge Instructions - Postpartum visit       Schedule postpartum visit with your provider and return to clinic in 6 weeks.                  Further instructions from your care team       Postpartum Vaginal Delivery Instructions    Activity       Ask family and friends for help when you need it.    Do not place anything in your vagina for 6 weeks.    You are not restricted on other activities, but take it easy for a few weeks to allow your body to recover from delivery.  You are able to do any activities you feel up to that point.    No driving until you have stopped taking your pain medications (usually two weeks after delivery).     Call your health care provider if you have any of these symptoms:       Increased pain,  "swelling, redness, or fluid around your stiches from an episiotomy or perineal tear.    A fever above 100.4 F (38 C) with or without chills when placing a thermometer under your tongue.    You soak a sanitary pad with blood within 1 hour, or you see blood clots larger than a golf ball.    Bleeding that lasts more than 6 weeks.    Vaginal discharge that smells bad.    Severe pain, cramping or tenderness in your lower belly area.    A need to urinate more frequently (use the toilet more often), more urgently (use the toilet very quickly), or it burns when you urinate.    Nausea and vomiting.    Redness, swelling or pain around a vein in your leg.    Problems breastfeeding or a red or painful area on your breast.    Chest pain and cough or are gasping for air.    Problems coping with sadness, anxiety, or depression.  If you have any concerns about hurting yourself or the baby, call your provider immediately.     You have questions or concerns after you return home.     Keep your hands clean:  Always wash your hands before touching your perineal area and stitches.  This helps reduce your risk of infection.  If your hands aren't dirty, you may use an alcohol hand-rub to clean your hands. Keep your nails clean and short.     your nails clean and short.        Pending Results     No orders found from 7/29/2018 to 8/1/2018.            Statement of Approval     Ordered          08/02/18 1141  I have reviewed and agree with all the recommendations and orders detailed in this document.  EFFECTIVE NOW     Approved and electronically signed by:  Ines Padilla APRN CNM             Admission Information     Date & Time Provider Department Dept. Phone    7/31/2018 Edwina Marrufo APRN CNM [facility] [phone]      Your Vitals Were     Blood Pressure Pulse Temperature Respirations Height Weight    110/62 67 98  F (36.7  C) (Oral) 16 1.67 m (5' 5.75\") 68 kg (150 lb)    BMI (Body Mass Index)                   24.4 " "kg/m2           MyChart Information     [facility] lets you send messages to your doctor, view your test results, renew your prescriptions, schedule appointments and more. To sign up, go to [email or web address] . Click on \"Log in\" on the left side of the screen, which will take you to the Welcome page. Then click on \"Sign up Now\" on the right side of the page.     You will be asked to enter the access code listed below, as well as some personal information. Please follow the directions to create your username and password.     Your access code is: [email or web address]  Expires: 10/9/2018  6:30 AM     Your access code will  in 90 days. If you need help or a new code, please call your [place] clinic or [phone].        Care EveryWhere ID     This is your Care EveryWhere ID. This could be used by other organizations to access your [place] medical records  [id number]        Equal Access to Services     SAFIA AUSTIN : Elida Garcia, waumer charles, qaybta kaalmajasmin cortes, nirmal tijerina . So [facility] [phone].    ATENCIÓN: Si jonasla handy, tiene a renee disposición servicios gratuitos de asistencia lingüística. Llame al [phone].    We comply with applicable federal civil rights laws and [place] laws. We do not discriminate on the basis of race, color, national origin, age, disability, sex, sexual orientation, or gender identity.               Review of your medicines      START taking        Dose / Directions    ibuprofen 800 MG tablet   Commonly known as:  ADVIL/MOTRIN        Dose:  800 mg   Take 1 tablet (800 mg) by mouth every 6 hours as needed for other (cramping)   Quantity:  90 tablet   Refills:  0       senna-docusate 8.6-50 MG per tablet   Commonly known as:  SENOKOT-S;PERICOLACE        Dose:  1 tablet   Take 1 tablet by mouth 2 times daily   Quantity:  100 tablet   Refills:  0         CONTINUE these medicines which have NOT CHANGED        Dose / Directions "    cholecalciferol 5000 units Caps capsule   Commonly known as:  vitamin D3   Used for:  Vitamin D deficiency        Dose:  5000 Units   Take 1 capsule (5,000 Units) by mouth daily Take one capsule daily.   Quantity:  90 capsule   Refills:  3       prenatal multivitamin plus iron 27-0.8 MG Tabs per tablet        Dose:  1 tablet   Take 1 tablet by mouth daily   Refills:  0            Where to get your medicines      These medications were sent to [facility] [place], [place] - [address]  [address] [address]     Phone:  [phone]     ibuprofen 800 MG tablet    senna-docusate 8.6-50 MG per tablet                Protect others around you: Learn how to safely use, store and throw away your medicines at www.disposemymeds.org.             Medication List: This is a list of all your medications and when to take them. Check marks below indicate your daily home schedule. Keep this list as a reference.      Medications           Morning Afternoon Evening Bedtime As Needed    cholecalciferol 5000 units Caps capsule   Commonly known as:  vitamin D3   Take 1 capsule (5,000 Units) by mouth daily Take one capsule daily.                                ibuprofen 800 MG tablet   Commonly known as:  ADVIL/MOTRIN   Take 1 tablet (800 mg) by mouth every 6 hours as needed for other (cramping)   Last time this was given:  800 mg on 8/1/2018 11:35 PM                                prenatal multivitamin plus iron 27-0.8 MG Tabs per tablet   Take 1 tablet by mouth daily                                senna-docusate 8.6-50 MG per tablet   Commonly known as:  SENOKOT-S;PERICOLACE   Take 1 tablet by mouth 2 times daily   Last time this was given:  2 tablets on 8/1/2018  8:23 AM

## 2018-07-31 NOTE — H&P
"ADMIT NOTE  =================  39w1d    Daniel Solares is a 33 year old female with an No LMP recorded. Patient is pregnant. and Estimated Date of Delivery: Aug 6, 2018 is admitted to the Birthplace on 2018 at 11:35 AM with in early labor.     HPI  ================  Reports onset of contractions this am    Contractions- moderate  Fetal movement- active  ROM- no   Vaginal bleeding- none  GBS- negative  FOB- is involved,       Weight gain- 155 - 128 lbs, Total weight gain- 27 lbs  Height- 5'5\"  BMI- 22  First prenatal visit at 12 weeks x 9 visits [facility] to [facility] at 36 wks x 2 visits  Total visits- 11    PROBLEM LIST  =================  Patient Active Problem List    Diagnosis Date Noted     Labor and delivery indication for care or intervention 2018     Priority: Medium     Language barrier 2018     Priority: Medium     Needs        Vitamin D deficiency 2018     Priority: Medium     18: Vit D = 17, discuss supplementation at next visit       Supervision of normal pregnancy 2018     Priority: Medium     Transfer of [facility] from [facility] at 36w3d  BRANDEN 18 by 10w ultrasound    HIV, RPR, HeBsAg nonreactive; rubella    18: Placed orders for Level II      GBS- neg,      Desires CNM care     EOB education at next visit, needs GC [ ] (CT negative)  18: Level II wnl, echo wnl      Tdap given           HISTORIES  ============  No Known Allergies  Past Medical History:   Diagnosis Date     Recurrent pregnancy loss, antepartum     Pt reports more than 10 miscarriages prior to daughter being born     History reviewed. No pertinent surgical history..  Family History   Problem Relation Age of Onset     Hypertension Mother      HEART DISEASE Mother      Thyroid Disease Mother      Social History   Substance Use Topics     Smoking status: Never Smoker     Smokeless tobacco: Never Used     Alcohol use No     Obstetric History       T2     L2     SAB10  TAB0   " Ectopic0   Multiple0   Live Births2       # Outcome Date GA Lbr Jatin/2nd Weight Sex Delivery Anes PTL Lv   13 Current            12 Term 16 39w5d  3.34 kg (7 lb 5.8 oz) M    TRISH   11 Term 14 40w2d  3.34 kg (7 lb 5.8 oz) F    TRISH   10 SAB            9 SAB            8 SAB            7 SAB            6 SAB            5 SAB            4 SAB            3 SAB            2 SAB            1 SAB               Obstetric Comments   No GDM, HTN, PPH.        LABS:   ===========  Prenatal Labs:  Rhogam not indicated   Lab Results   Component Value Date    ABO A 2018    RH Pos 2018    AS Neg 2018    RUQIGG 11 2018    HGB 11.2 (L) 2018     Rubella immune  Lab Results   Component Value Date    GBS Negative 2018     Other labs:  No results found for this or any previous visit (from the past 24 hour(s)).    ROS  =========  Pt denies significant respiratory, cardiovacular, GI, or muscular/skeletalcomplaints.    See RN data base ROS.       PHYSICAL EXAM:  ===============  There were no vitals taken for this visit.  General appearance: uncomfortable with contractions  GENERAL APPEARANCE: healthy, alert and no distress  RESP: lungs clear to auscultation - no rales, rhonchi or wheezes  BREAST: normal without masses, tenderness or nipple discharge and no palpable axillary masses or adenopathy  CV: regular rates and rhythm, normal S1 S2, no S3 or S4 and no murmur,and no varicosities  ABDOMEN:  soft, nontender, no epigastric pain  SKIN: no suspicious lesions or rashes  NEURO: Denies headache, blurred vision, other vision changes  PSYCH: mentation appears normal. and affect normal/bright  Legs: Reflexes normal bilaterally     Abdomen: gravid, vertex fetus per Leopold's, non-tender between contractions.   Cephalic presentation confirmed by BSUS  EFW-  7 lbs.   CONTACTIONS: moderate and every 2-3 minutes  FETAL HEART TONES: continuous EFM- baseline 145 with moderate variability and positive  accelerations. No decelerations.  PELVIC EXAM: 4/ 70%/ Mid/ soft/ -2   HURTADO SCORE: 10  BLOODY SHOW: no   ROM:no  FLUID: none  ROMPLUS: not done    # Pain Assessment:   - Daniel is experiencing pain due to labor. Pain management was discussed with Daniel and her spouse and the plan was created in a collaborative fashion.  Daniel's response to the current recommendations: engaged  - Please see the plan for pain management as documented above        ASSESSMENT:  ==============  IUP @ 39w1d admitted in early labor   NST REACTIVE  Fetal Heart Rate - category one  GBS- negative     PLAN:  ===========  Admit - see IP orders  Pain medication options reviewed. Pt declines at this time   Dr. Trejo available for consultation prn   Anticipate   MARTITA Price CNM

## 2018-07-31 NOTE — PLAN OF CARE
Data: Daniel Solares transferred to 7124 via wheelchair at 1515. Baby transferred via parent's arms.  Action: Receiving unit notified of transfer: Yes. Patient and family notified of room change. Report given to Amaal at arrival. Belongings sent to receiving unit. Accompanied by Registered Nurse. Oriented patient to surroundings. Call light within reach. ID bands double-checked with receiving RN.  Response: Patient tolerated transfer and is stable.

## 2018-07-31 NOTE — PLAN OF CARE
Data: Patient presented to [facility] at 1105 c/o painful contractions q4-5 minutes. Denies LOF or bleeding.     Reason for maternal/fetal assessment per patient is Laboring  .  Patient is a . Prenatal record reviewed.      Obstetric History       T2     L2     SAB10  TAB0   Ectopic0   Multiple0   Live Births2       # Outcome Date GA Lbr Jatin/2nd Weight Sex Delivery Anes PTL Lv   13 Current            12 Term 16 39w5d  3.34 kg (7 lb 5.8 oz) M    TRISH   11 Term 14 40w2d  3.34 kg (7 lb 5.8 oz) F    TRISH   10 SAB            9 SAB            8 SAB            7 SAB            6 SAB            5 SAB            4 SAB            3 SAB            2 SAB            1 SAB               Obstetric Comments   No GDM, HTN, PPH.   . 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 Gestational Age 39w1d. VSS. Fetal movement present. Patient denies  vaginal discharge, pelvic pressure, UTI symptoms, GI problems, bloody show, vaginal bleeding, edema, headache, visual disturbances, epigastric or URQ pain, abdominal pain, rupture of membranes.  present.  Action: Verbal consent for EFM. Triage assessment completed. EFM applied upon arrival. Uterine assessment with toco shows contractions q5 minutes lasting 50-60 seconds. Fetal assessment: Presumed adequate fetal oxygenation documented (see flow record).   Response: Rosa Maria Wharton informed of pt's arrival and discomfort. SVE by Rosa Maria shows cervix to be 4/80/0/intact/soft, admit to 473.. Patient verbalized agreement with plan. Oriented to room and call light, admission completed with Mandarin .

## 2018-07-31 NOTE — PLAN OF CARE
Problem: Postpartum (Vaginal Delivery) (Adult,Obstetrics,Pediatric)  Goal: Signs and Symptoms of Listed Potential Problems Will be Absent, Minimized or Managed (Postpartum)  Signs and symptoms of listed potential problems will be absent, minimized or managed by discharge/transition of care (reference Postpartum (Vaginal Delivery) (Adult,Obstetrics,Pediatric) CPG).   Outcome: No Change  Resting well in bed. Will continue with current plan of care.

## 2018-07-31 NOTE — PLAN OF CARE
Problem: Patient Care Overview  Goal: Plan of Care/Patient Progress Review  Outcome: Improving  Patient arrived to [facility] unit via wheel chair with belongings, accompanied by RN, with infant in arms. Got report from Kimberli  and checked bands. Unit and room orientation done. No concerns a this time. Continue with plan of care.

## 2018-07-31 NOTE — PLAN OF CARE
Problem: Patient Care Overview  Goal: Plan of Care/Patient Progress Review  Outcome: Improving  Vaginal Delivery Note   of viable Male with Rosa Maria HURLEY CNM in attendance.  Nursery RN Claudia HOFFMANN present, NICU called to delivery due to infant apnea immediately following delivery, dried and stimulated, infant with spontaneous lusty cry at 1 min, to mother's abdomen.  APGAR at 1 minute:  7 and APGAR at 5 minutes:  9.  Placenta delivered with out complication, pitocin 340ml/hr following delivery of placenta, 1st degree laceration, with repair, marine cares provided.  Mother and baby in stable condition.

## 2018-07-31 NOTE — IP AVS SNAPSHOT
[facility]    [address] [facility] [facility] [address]    Phone:  [phone]                                       After Visit Summary   7/31/2018    Daniel Solares    MRN: [id number]           After Visit Summary Signature Page     I have received my discharge instructions, and my questions have been answered. I have discussed any challenges I see with this plan with the nurse or doctor.    ..........................................................................................................................................  Patient/Patient Representative Signature      ..........................................................................................................................................  Patient Representative Print Name and Relationship to Patient    ..................................................               ................................................  Date                                            Time    ..........................................................................................................................................  Reviewed by Signature/Title    ...................................................              ..............................................  Date                                                            Time

## 2018-07-31 NOTE — PROVIDER NOTIFICATION
07/31/18 1114   Provider Notification   Provider Name/Title Rosa Maria Marrufo CNM   Method of Notification Electronic Page   Request Evaluate in Person   Notification Reason Patient Arrived;Labor Status;Pain

## 2018-07-31 NOTE — TELEPHONE ENCOUNTER
Spoke with Daniel through her friend that is with her. She is 39 weeks 1 day today and this is her 3rd baby. She has started having contractions early this morning. She describes them as tightening but states that they only last a few seconds. They vary from 10-15 minutes apart and are not regular. Denies any leaking fluid or bleeding. Does have +FM. Nurse advised calling back when contractions are closer together or going to L&D when are 5-7 minutes apart. Patient agreeable. Has appointment in clinic tomorrow.

## 2018-08-01 ENCOUNTER — OFFICE VISIT (OUTPATIENT)
Dept: INTERPRETER SERVICES | Facility: CLINIC | Age: 33
End: 2018-08-01
Payer: COMMERCIAL

## 2018-08-01 LAB
HGB BLD-MCNC: 10 G/DL (ref 11.7–15.7)
T PALLIDUM AB SER QL: NONREACTIVE

## 2018-08-01 PROCEDURE — 85018 HEMOGLOBIN: CPT | Performed by: MIDWIFE

## 2018-08-01 PROCEDURE — 36415 COLL VENOUS BLD VENIPUNCTURE: CPT | Performed by: MIDWIFE

## 2018-08-01 PROCEDURE — 0HQ9XZZ REPAIR PERINEUM SKIN, EXTERNAL APPROACH: ICD-10-PCS | Performed by: MIDWIFE

## 2018-08-01 PROCEDURE — 25000132 ZZH RX MED GY IP 250 OP 250 PS 637: Performed by: MIDWIFE

## 2018-08-01 PROCEDURE — T1013 SIGN LANG/ORAL INTERPRETER: HCPCS | Mod: U3

## 2018-08-01 PROCEDURE — 12000028 ZZH R&B OB UMMC

## 2018-08-01 RX ORDER — IBUPROFEN 800 MG/1
800 TABLET, FILM COATED ORAL EVERY 6 HOURS PRN
Qty: 90 TABLET | Refills: 0 | Status: SHIPPED | OUTPATIENT
Start: 2018-08-01

## 2018-08-01 RX ORDER — AMOXICILLIN 250 MG
1 CAPSULE ORAL 2 TIMES DAILY
Qty: 100 TABLET | Refills: 0 | Status: SHIPPED | OUTPATIENT
Start: 2018-08-01

## 2018-08-01 RX ADMIN — IBUPROFEN 800 MG: 800 TABLET ORAL at 23:35

## 2018-08-01 RX ADMIN — IBUPROFEN 800 MG: 800 TABLET ORAL at 08:23

## 2018-08-01 RX ADMIN — SENNOSIDES AND DOCUSATE SODIUM 2 TABLET: 8.6; 5 TABLET ORAL at 08:23

## 2018-08-01 NOTE — L&D DELIVERY NOTE
CNM Delivery Note  Labor Course: pt presented in early labor progressed rapidly  Used nitrous oxide. Progressed from 6cm intact to baby in 20 min   IUP at 39 weeks gestation delivered on .     delivery of a viable Male infant.  Weight : 6 pounds 12 ounces   Apgars of 7 at 1 minute and 9 at 5 minutes.  Labor was spontaneous.  Medications administered  in labor:  Pain Rx nitrous oxide   Perineum: shallow vaginal tear  Pt requests repair  3 small interrupted sutures   Placenta-mechanism: spontaneous, intact,  with a 3 vessel cord. IV oxytocin was given.  QBLs was .see flow 200cc in drape   Complications of pregnancy, labor and delivery: None  Anticipated d/c date: 18   Birth attendants:MARTITA Price,KETTY    Delivery Summary - No Schreer  Delivery Summary    Daniel Solares MRN# [id number]   Age: 33 year old YOB: 1985     Labor Event Times:    Labor Onset Date       Labor Onset Time    Dilation Complete Date    Dilation Complete Time       Start Pushing Date        Start Pushing Time            Labor Length:    1st Stage (hrs/min) 3.00 23.00   2nd Stage (hrs/min) 0.00 1.00   3rd Stage (hrs/min) 0.00 4.00       Labor Events:     Labor No   Rupture Date     Rupture Time     Rupture Type Spontaneous rupture of membranes occuring during spontaneous labor or augmentation   Fluid Color     Labor Type     Induction    Induction Indication         Augmentation    Labor Complications     Additional Complications     Management of Labor        Antibiotics     IV Antibiotic Given     Additional Management     Fetal Status Prior to  Delivery     Fetal Status Comments         Cervical Ripening:    Date     Time     Type         Delivery:    Episiotomy None   Local Anesthetic        Lacerations 1st   Sponge Count Correct       Needle Count Correct     Final Count by:    Sutures     Blood loss (ml)    Packing Intentionally Left In     Number     Comments           Information for the patient's  ":  Sujatha, Baby1 Daniel [[id number]]       Delivery  2018 12:54 PM by  Vaginal, Spontaneous Delivery  Sex:  male Gestational Age: 39w1d  Delivery Clinician:     Living?:            APGARS  One minute Five minutes Ten minutes   Skin color:            Heart rate:            Grimace:            Muscle tone:            Breathing:            Totals: 7  9         Presentation/position:           Resuscitation and Interventions: Method:  None  Oxygen Type:     Intubation Time:   # of Attempts:     ETT Size:        Tracheal Suction:     Tracheal returns:       Care at Delivery:  Infant to mother's abdomen, dried and stimulated. No respiratory effort noted, NICU code button initiated, cord clamped and cut, infant with spontaneous cry at 1 minute of birth. NICU code cancelled, infant with lusty cry, skin to sking with mother.   VS WDL.         Cord information:     Disposition of cord blood:      Blood gases sent?    Complications:     Placenta: Delivered:           appearance.  Comments:  .  Disposition: Patient possesion  [place] Measurements:  Weight: 6 lb 12.6 oz (3080 g)  Height: 19.25\"  Head circumference: 33 cm  Chest circumference:     Temperature:     Other providers:       Additional  information:  Forceps:    Verbal Informed Consent Obtained:       Alternative Labor Strategies Discussed:     Emergency Resources Available:       Type:       Accrued Pulling Time:       # of Pulls:      Position:     Fetal Station:       Indications:      Other Indications:     Operative Vaginal Delivery Brief Note Forceps:        Vacuum:    Verbal Informed Consent Obtained:     Alternative Labor Strategies Discussed:     Emergency Resources Available:     Type:      Accrued Pulling Time:       # of Pop-Offs:       # of Pulls:       Position:     Fetal Station:      Indications for Vacuum:       Other Indications:    Operative Vaginal Delivery Brief Note Vacuum:        Shoulder Dystocia Shoulder Dystocia    Fetal Tracing " Prior to Delivery:  Category 1   Shoulder dystocia present?:  No                                            Breech:       : Type:     Indications for Primary:     Indications for Secondary:     Other Indications:        Observed anomalies     Output in Delivery Room:

## 2018-08-01 NOTE — PLAN OF CARE
Problem: Patient Care Overview  Goal: Plan of Care/Patient Progress Review  Outcome: Therapy, progress toward functional goals as expected  PP assessment WNL. Vital signs stable. Pt up ad natalya, steady gait. Intake and output remains appropriate. Pt. Denies pain but does endorse cramping with breastfeeding and states she feels to cramping more with breastfeeding her 18 mo old who is in the room with her than she does with the infant.  She stated she will let us know if she needs IBU but has denied the need for IBU or TYL. No further concerns, will continue with pt plan of care.

## 2018-08-01 NOTE — PROGRESS NOTES
"Postpartum Day #1 Note:  SIGNIFICANT PROBLEMS:  Patient Active Problem List    Diagnosis Date Noted     Labor and delivery indication for care or intervention 07/31/2018     Priority: Medium     Vaginal delivery 07/31/2018     Priority: Medium     Language barrier 07/25/2018     Priority: Medium     Needs        Vitamin D deficiency 07/18/2018     Priority: Medium     7/12/18: Vit D = 17, discuss supplementation at next visit       Supervision of normal pregnancy 07/12/2018     Priority: Medium     Transfer of care from [facility] at 36w3d  BRANDEN 8/6/18 by 10w ultrasound    HIV, RPR, HeBsAg nonreactive; rubella    7/12/18: Placed orders for Level II      GBS- neg,      Desires CNM care     EOB education at next visit, needs GC [ ] (CT negative)  7/25/18: Level II wnl, echo wnl      Tdap given         INTERVAL HISTORY:  BP 99/49  Pulse 77  Temp 98.3  F (36.8  C)  Resp 20  Ht 1.67 m (5' 5.75\")  Wt 68 kg (150 lb)  Breastfeeding? Unknown  BMI 24.4 kg/m2  Pt stable, baby is rooming in.   Breast feeding status: initiated and going well. Pt reports baby does not seem to like being at breast and does not feel they are getting a lot of breastmilk. Pt has been continuing to nurse 18 mos old. Has been having adequate number of wet/dirty diapers.   Complications since 2 hours post delivery: None  Patient is tolerating activity well. Voiding without difficulty, no BM yet.  Cramping is minimal and relieved by ibuprofen, lochia is decreasing and patient denies clots.  Perineal pain is is minimal.  The perineum laceration is well approximated and no edema present.    Physical Exam:  Breasts: soft, nontender, nipples intact  Abdomen: soft, nontender, fundus at U  Lochia: small amount, rubra, no clots or odor  Perineum: well-approximated, no edema  Extremities: no edema    Postpartum hemoglobin   Hemoglobin   Date Value Ref Range Status   08/01/2018 10.0 (L) 11.7 - 15.7 g/dL Final     Blood type   Lab Results "   Component Value Date    ABO A 07/31/2018       Lab Results   Component Value Date    RH Pos 07/31/2018     Rubella status immune  History of depression: denies. Postpartum depression warning signs reviewed.    ASSESSMENT/PLAN:  Normal postpartum exam, stable postpartum day #1  Complications:none  Plan d/c home tomorrow. Home Visit Ordered- No  RTC 6 weeks for postpartum visit, earlier as needed for breastfeeding support or other postpartum concerns  Postpartum warning s/s reviewed, including bleeding/clots, fever, mastitis, or depression  Continue prenatal vitamins. Ibuprofen and stool softener as needed.  Birthcontrol planned: Undecided. Fertility and contraception options reviewed.    Current Discharge Medication List      CONTINUE these medications which have NOT CHANGED    Details   cholecalciferol (VITAMIN D3) 5000 units CAPS capsule Take 1 capsule (5,000 Units) by mouth daily Take one capsule daily.  Qty: 90 capsule, Refills: 3    Associated Diagnoses: Vitamin D deficiency      Prenatal Vit-Fe Fumarate-FA (PRENATAL MULTIVITAMIN PLUS IRON) 27-0.8 MG TABS per tablet Take 1 tablet by mouth daily           MARTITA Taylor CNM

## 2018-08-02 ENCOUNTER — OFFICE VISIT (OUTPATIENT)
Dept: INTERPRETER SERVICES | Facility: CLINIC | Age: 33
End: 2018-08-02
Payer: COMMERCIAL

## 2018-08-02 VITALS
HEIGHT: 66 IN | TEMPERATURE: 98 F | HEART RATE: 67 BPM | RESPIRATION RATE: 16 BRPM | DIASTOLIC BLOOD PRESSURE: 62 MMHG | SYSTOLIC BLOOD PRESSURE: 110 MMHG | BODY MASS INDEX: 24.11 KG/M2 | WEIGHT: 150 LBS

## 2018-08-02 PROCEDURE — T1013 SIGN LANG/ORAL INTERPRETER: HCPCS | Mod: U3

## 2018-08-02 NOTE — DISCHARGE INSTRUCTIONS

## 2018-08-02 NOTE — PLAN OF CARE
Problem: Patient Care Overview  Goal: Plan of Care/Patient Progress Review  Outcome: Adequate for Discharge Date Met: 08/02/18  Data: Vital signs within normal limits. Postpartum checks within normal limits - see flow record. Patient eating and drinking normally. Patient able to empty bladder independently and is up ambulating. No apparent signs of infection. Episiotomy healing well. Patient performing self cares and is able to care for infant.  Action: Patient declined any pain medication, patient stated she was comfortable. Patient education and discharge instructions reviewed with  present. See flow record.  Response: Positive attachment behaviors observed with infant. Support persons present.   Plan: Patient has no discharge concerns. Patient discharged to home.

## 2018-08-02 NOTE — DISCHARGE SUMMARY
"Post Partum Note and Discharge Summary  SIGNIFICANT PROBLEMS:  Patient Active Problem List    Diagnosis Date Noted     Labor and delivery indication for care or intervention 2018     Priority: Medium     Vaginal delivery 2018     Priority: Medium     Language barrier 2018     Priority: Medium     Needs        Vitamin D deficiency 2018     Priority: Medium     18: Vit D = 17, discuss supplementation at next visit       Supervision of normal pregnancy 2018     Priority: Medium     Transfer of care from [place] at 36w3d  BRANDEN 18 by 10w ultrasound    HIV, RPR, HeBsAg nonreactive; rubella    18: Placed orders for Level II      GBS- neg,      Desires CNM care     EOB education at next visit, needs GC [ ] (CT negative)  18: Level II wnl, echo wnl      Tdap given           ADMISSION DIAGNOSIS:  Labor and Delivery   DISCHARGE DIAGNOSIS:     HOSPITAL COURSE:  39w1d  Daniel Solares is a 33 year old female     [facility] 11:07 AM  in in early labor.   CNM Delivery Note  Labor Course: pt presented in early labor progressed rapidly  Used nitrous oxide. Progressed from 6cm intact to baby in 20 min   IUP at 39 weeks gestation delivered on 2018.     delivery of a viable Male infant.  Weight : 6 pounds 12 ounces   Apgars of 7 at 1 minute and 9 at 5 minutes.  Labor was spontaneous.  Medications administered  in labor:  Pain Rx nitrous oxide   Perineum: shallow vaginal tear  Pt requests repair  3 small interrupted sutures   Placenta-mechanism: spontaneous, intact,  with a 3 vessel cord. IV oxytocin was given.  QBLs was .see flow 200cc in drape   Complications of pregnancy, labor and delivery: None  Anticipated d/c date: 18   Birth attendants:MARTITA Price,CNM       INTERVAL HISTORY:  /62  Pulse 67  Temp 98  F (36.7  C) (Oral)  Resp 16  Ht 1.67 m (5' 5.75\")  Wt 68 kg (150 lb)  Breastfeeding? " Unknown  BMI 24.4 kg/m2   here  Pt stable, baby is rooming in. Feels well, no dizziness. Wants to go home today.  to help at home  Breast feeding status:initiated and going well  # Discharge Pain Plan:   - During her hospitalization, Daniel experienced pain due to labor and delivery.  The pain plan for discharge was discussed with Daniel and her spouse and the plan was created in a collaborative fashion.    - ibuprofen, tylenol, warm packs and sitz bath    Complications since 2 hours post delivery: None  Patient is tolerating activity well, Voiding without difficulty, cramping is relieved by Ibuprophen, lochia is decreasing and patient denies clots.  Perineal pain is is relieved by Ibuprophen.  The perineum laceration is well approximated    Postpartum hemoglobin   Hemoglobin   Date Value Ref Range Status   2018 10.0 (L) 11.7 - 15.7 g/dL Final      Prenatal Labs:   Lab Results   Component Value Date    ABO A 2018    RH Pos 2018    AS Neg 2018    RUQIGG 11 2018     Rubella: immune  History of depression: none. Postpartum depression warning signs reviewed.    ASSESSMENT/PLAN:  Normal postpartum exam , Stable Post-partum day #1  Complications:mildly anemic, plan to continue PNV and Fe rich foods  Plan d/c home today. Home Visit Ordered- Yes: early DC  RTC 6 weeks  Postpartum warning s/s reviewed, including bleeding/clots, fever, mastitis, or depression  Kegels/ crunches  Continue prenatal vitamins  Birthcontrol planned:Condoms  PROCEDURES:      Current Discharge Medication List      START taking these medications    Details   ibuprofen (ADVIL/MOTRIN) 800 MG tablet Take 1 tablet (800 mg) by mouth every 6 hours as needed for other (cramping)  Qty: 90 tablet, Refills: 0    Associated Diagnoses: Vaginal delivery      senna-docusate (SENOKOT-S;PERICOLACE) 8.6-50 MG per tablet Take 1 tablet by mouth 2 times daily  Qty: 100 tablet, Refills: 0    Associated Diagnoses: Vaginal  delivery         CONTINUE these medications which have NOT CHANGED    Details   cholecalciferol (VITAMIN D3) 5000 units CAPS capsule Take 1 capsule (5,000 Units) by mouth daily Take one capsule daily.  Qty: 90 capsule, Refills: 3    Associated Diagnoses: Vitamin D deficiency      Prenatal Vit-Fe Fumarate-FA (PRENATAL MULTIVITAMIN PLUS IRON) 27-0.8 MG TABS per tablet Take 1 tablet by mouth daily           MARTITA Bell CNM

## 2018-08-02 NOTE — PLAN OF CARE
Problem: Patient Care Overview  Goal: Plan of Care/Patient Progress Review  Outcome: Improving  [id number]  Data: Vital signs within normal limits. Postpartum checks within normal limits - see flow record. Patient eating and drinking normally. Patient able to empty bladder independently and is up ambulating. No apparent signs of infection.   Patient performing self cares and is able to care for infant.  Action: Patient medicated during the shift for uterine cramping. See MAR. Patient reassessed within 1 hour after each medication and pain was improved - patient stated she was comfortable. Patient education done about self care via .    Response: Positive attachment behaviors observed with infant. Support persons  present.   Plan: Anticipate discharge when medically ready.

## 2018-08-02 NOTE — PLAN OF CARE
Problem: Patient Care Overview  Goal: Plan of Care/Patient Progress Review  Outcome: Improving  PP assessment WNL. Vital signs stable. Pt up ad natalya, steady gait. Intake and output remains appropriate. Encouraged fluid intake. Cramping pain managed with Ibuprofen -see MAR. Breastfeeding infant independently.  No further concerns, will continue with pt plan of care.

## 2018-09-13 ENCOUNTER — OFFICE VISIT (OUTPATIENT)
Dept: OBGYN | Facility: CLINIC | Age: 33
End: 2018-09-13
Attending: ADVANCED PRACTICE MIDWIFE
Payer: COMMERCIAL

## 2018-09-13 VITALS
DIASTOLIC BLOOD PRESSURE: 69 MMHG | BODY MASS INDEX: 24.24 KG/M2 | HEIGHT: 65 IN | SYSTOLIC BLOOD PRESSURE: 111 MMHG | HEART RATE: 83 BPM | WEIGHT: 145.5 LBS

## 2018-09-13 PROCEDURE — T1013 SIGN LANG/ORAL INTERPRETER: HCPCS | Mod: U3,ZF

## 2018-09-13 PROCEDURE — G0463 HOSPITAL OUTPT CLINIC VISIT: HCPCS

## 2018-09-13 ASSESSMENT — ANXIETY QUESTIONNAIRES
6. BECOMING EASILY ANNOYED OR IRRITABLE: NOT AT ALL
1. FEELING NERVOUS, ANXIOUS, OR ON EDGE: NOT AT ALL
GAD7 TOTAL SCORE: 0
2. NOT BEING ABLE TO STOP OR CONTROL WORRYING: NOT AT ALL
3. WORRYING TOO MUCH ABOUT DIFFERENT THINGS: NOT AT ALL
7. FEELING AFRAID AS IF SOMETHING AWFUL MIGHT HAPPEN: NOT AT ALL
5. BEING SO RESTLESS THAT IT IS HARD TO SIT STILL: NOT AT ALL

## 2018-09-13 ASSESSMENT — PATIENT HEALTH QUESTIONNAIRE - PHQ9: 5. POOR APPETITE OR OVEREATING: NOT AT ALL

## 2018-09-13 NOTE — LETTER
2018       RE: Daniel Solares  [address] [address]     Dear Colleague,    Thank you for referring your patient, Daniel Solares, to the [facility] at [facility]. Please see a copy of my visit note below.    Nursing Notes:   Lisset Gastelum CMA  2018  1:41 PM  Signed  SUBJECTIVE:   Daniel Solares is here for her 6-week postpartum checkup.     PHQ-9 score: 0  Hx of Abuse:  No    Delivery Date: 2018.    Delivering provider:  Rosa Maria Marrufo CNM.    Type of delivery:  .  Perineum:  tear, with repair.     Delivery complications: None  Infant gender:  boy, weight 6 pounds 12.6 oz.  Feeding Method:  .  Complications reported with feeding:  none, infant thriving .    Bleeding:  None.  Duration:  6weeks.  Menses resumed:  No  Bowel/Urinary problems:  No    Contraception Planned:  None -- is she planning pregnancy? no  She  has not had intercourse since delivery..    ================================================================   Pt feeling well in postpartum period. Breastfeeding going well, nursing q 2 hours. Denies any lump or pain in breasts. Has been able to sleep well during the night, between nursing. Denies any abdominal or perineal pain. Pt reports pinkish discharge recently, stopped 2 days ago. Denies any other bleeding. Denies any urinary issues. Denies constipation or hemorrhoids.     Reports mood has been stable. No signs of postpartum depression or anxiety. Has not had intercourse since delivery. Planning condoms for contraception. Had pap at beginning of this pregnancy, which was normal.   Desires copy of medical records, will be leaving country ~ 2019.  ================================================================  ROS: 10 point ROS neg other than the symptoms noted above in the HPI.   CONSTITUTIONAL: negative  RESPIRATORY: negative  CARDIOVASCULAR: negative  GI: negative  SKIN: negative  PSYCHIATRIC:  "negative    EXAM:  /69 (BP Location: Left arm, Patient Position: Chair)  Pulse 83  Ht 1.651 m (5' 5\")  Wt 66 kg (145 lb 8 oz)  BMI 24.21 kg/m2    General: healthy, alert and no distress  Psych: NEGATIVE  Last PHQ-9 score on record= No Value exists for the : HP#PHQ9  Breasts:  Lactating  Abdomen: Benign, Soft, flat, non-tender, No masses, organomegaly and Diastasis less than 1-2 FB  Incision:  None   Vulva:  Normal genitalia and Bartholin's, Urethra, Navesink's normal  Vagina:  normal with good muscle tone  Cervix:  no lesions and moist, closed, without lesion or CMT.    Uterus:  fully involuted and non-tender    Adnexa:  Within normal limits and No masses, nodularity, tenderness  Recto-vaginal:   anus normal    Assessment:  Encounter Diagnosis   Name Primary?     Routine postpartum follow-up Yes      Normal postpartum exam after   Pregnancy was complicated by:  none.      Plan:   Contraception methods discussed  Signs and symptoms of postpartum depression/anxiety discussed  Discussed calcium intake, vitamins and supplements including Vitamin D  Exercise encouraged  ISAIAH release completed and signed  Follow up in 1 year or earlier as needed      Again, thank you for allowing me to participate in the care of your patient.      Sincerely,    MARTITA Taylor CNM      "

## 2018-09-13 NOTE — NURSING NOTE
SUBJECTIVE:   Daniel Solares is here for her 6-week postpartum checkup.     PHQ-9 score: 0  Hx of Abuse:  No    Delivery Date: 2018.    Delivering provider:  Rosa Maria Marrufo CNM.    Type of delivery:  .  Perineum:  tear, with repair.     Delivery complications: None  Infant gender:  boy, weight 6 pounds 12.6 oz.  Feeding Method:  .  Complications reported with feeding:  none, infant thriving .    Bleeding:  None.  Duration:  6weeks.  Menses resumed:  No  Bowel/Urinary problems:  No    Contraception Planned:  None -- is she planning pregnancy? no  She  has not had intercourse since delivery..

## 2018-09-13 NOTE — MR AVS SNAPSHOT
"              After Visit Summary   2018    Daniel Solares    MRN: [id number]           Patient Information     Date Of Birth          1985        Visit Information        Provider Department      2018 1:15 PM Paddy Rosa Andrea N, APRN [facility]        Today's Diagnoses     Routine postpartum follow-up    -  1       Follow-ups after your visit        Follow-up notes from your care team     Return in about 1 year (around 2019) for Annual exam.      Who to contact     Please call your clinic at [phone] to:    Ask questions about your health    Make or cancel appointments    Discuss your medicines    Learn about your test results    Speak to your doctor            Additional Information About Your Visit        MyChart Information     BIXIt is an electronic gateway that provides easy, online access to your medical records. With Bloominous, you can request a clinic appointment, read your test results, renew a prescription or communicate with your care team.     To sign up for BIXIt visit the website at [email or web address]   You will be asked to enter the access code listed below, as well as some personal information. Please follow the directions to create your username and password.     Your access code is: [email or web address]  Expires: 10/9/2018  6:30 AM     Your access code will  in 90 days. If you need help or a new code, please contact your [facility] or call [phone] for assistance.        Care EveryWhere ID     This is your Care EveryWhere ID. This could be used by other organizations to access your [place] medical records  [id number]        Your Vitals Were     Pulse Height BMI (Body Mass Index)             83 1.651 m (5' 5\") 24.21 kg/m2          Blood Pressure from Last 3 Encounters:   18 111/69   18 110/62   18 109/66    Weight from Last 3 Encounters:   18 66 kg (145 lb 8 oz)   18 " 68 kg (150 lb)   07/25/18 70.5 kg (155 lb 6.4 oz)              Today, you had the following     No orders found for display       Primary Care Provider Fax #    Physician No Ref-Primary [phone]       No address on file        Equal Access to Services     SAFIA [place] : Elida arti downing bo Sonahomy, waangelda luqadaha, qaybta kaalmada sophia, nirmal hamilton laelizabethj luis . So [facility] [phone].    ATENCIÓN: Si habla español, tiene a renee disposición servicios gratuitos de asistencia lingüística. Llame al [phone].    We comply with applicable federal civil rights laws and [place] laws. We do not discriminate on the basis of race, color, national origin, age, disability, sex, sexual orientation, or gender identity.            Thank you!     Thank you for choosing [facility]  for your care. Our goal is always to provide you with excellent care. Hearing back from our patients is one way we can continue to improve our services. Please take a few minutes to complete the written survey that you may receive in the mail after your visit with us. Thank you!             Your Updated Medication List - Protect others around you: Learn how to safely use, store and throw away your medicines at www.disposemymeds.org.          This list is accurate as of 9/13/18  2:17 PM.  Always use your most recent med list.                   Brand Name Dispense Instructions for use Diagnosis    cholecalciferol 5000 units Caps capsule    vitamin D3    90 capsule    Take 1 capsule (5,000 Units) by mouth daily Take one capsule daily.    Vitamin D deficiency       ibuprofen 800 MG tablet    ADVIL/MOTRIN    90 tablet    Take 1 tablet (800 mg) by mouth every 6 hours as needed for other (cramping)    Vaginal delivery       prenatal multivitamin plus iron 27-0.8 MG Tabs per tablet      Take 1 tablet by mouth daily        senna-docusate 8.6-50 MG per tablet    SENOKOT-S;PERICOLACE    100 tablet    Take 1 tablet  by mouth 2 times daily    Vaginal delivery

## 2018-09-13 NOTE — PROGRESS NOTES
"Nursing Notes:   Lisset Gastelum CMA  2018  1:41 PM  Signed  SUBJECTIVE:   Daniel Solares is here for her 6-week postpartum checkup.     PHQ-9 score: 0  Hx of Abuse:  No    Delivery Date: 2018.    Delivering provider:  Rosa Maria Marrufo CNM.    Type of delivery:  .  Perineum:  tear, with repair.     Delivery complications: None  Infant gender:  boy, weight 6 pounds 12.6 oz.  Feeding Method:  .  Complications reported with feeding:  none, infant thriving .    Bleeding:  None.  Duration:  6weeks.  Menses resumed:  No  Bowel/Urinary problems:  No    Contraception Planned:  None -- is she planning pregnancy? no  She  has not had intercourse since delivery..    ================================================================   Pt feeling well in postpartum period. Breastfeeding going well, nursing q 2 hours. Denies any lump or pain in breasts. Has been able to sleep well during the night, between nursing. Denies any abdominal or perineal pain. Pt reports pinkish discharge recently, stopped 2 days ago. Denies any other bleeding. Denies any urinary issues. Denies constipation or hemorrhoids.     Reports mood has been stable. No signs of postpartum depression or anxiety. Has not had intercourse since delivery. Planning condoms for contraception. Had pap at beginning of this pregnancy, which was normal.   Desires copy of medical records, will be leaving country ~ 2019.  ================================================================  ROS: 10 point ROS neg other than the symptoms noted above in the HPI.   CONSTITUTIONAL: negative  RESPIRATORY: negative  CARDIOVASCULAR: negative  GI: negative  SKIN: negative  PSYCHIATRIC: negative    EXAM:  /69 (BP Location: Left arm, Patient Position: Chair)  Pulse 83  Ht 1.651 m (5' 5\")  Wt 66 kg (145 lb 8 oz)  BMI 24.21 kg/m2    General: healthy, alert and no distress  Psych: NEGATIVE  Last PHQ-9 score on record= No Value exists for the : " #PHQ9  Breasts:  Lactating  Abdomen: Benign, Soft, flat, non-tender, No masses, organomegaly and Diastasis less than 1-2 FB  Incision:  None   Vulva:  Normal genitalia and [place]tholin's, Urethra, [place]'s normal  Vagina:  normal with good muscle tone  Cervix:  no lesions and moist, closed, without lesion or CMT.    Uterus:  fully involuted and non-tender    Adnexa:  Within normal limits and No masses, nodularity, tenderness  Recto-vaginal:   anus normal    Assessment:  Encounter Diagnosis   Name Primary?     Routine postpartum follow-up Yes      Normal postpartum exam after   Pregnancy was complicated by:  none.      Plan:   Contraception methods discussed  Signs and symptoms of postpartum depression/anxiety discussed  Discussed calcium intake, vitamins and supplements including Vitamin D  Exercise encouraged  ISAIAH release completed and signed  Follow up in 1 year or earlier as needed

## 2018-09-13 NOTE — LETTER
Date:September 14, 2018      Patient was self referred, no letter generated. Do not send.        [facility] Information

## 2018-09-14 ASSESSMENT — PATIENT HEALTH QUESTIONNAIRE - PHQ9: SUM OF ALL RESPONSES TO PHQ QUESTIONS 1-9: 0

## 2018-09-14 ASSESSMENT — ANXIETY QUESTIONNAIRES: GAD7 TOTAL SCORE: 0

## 2019-02-15 ENCOUNTER — HEALTH MAINTENANCE LETTER (OUTPATIENT)
Age: 34
End: 2019-02-15

## 2020-03-11 ENCOUNTER — HEALTH MAINTENANCE LETTER (OUTPATIENT)
Age: 35
End: 2020-03-11

## 2021-01-03 ENCOUNTER — HEALTH MAINTENANCE LETTER (OUTPATIENT)
Age: 36
End: 2021-01-03

## 2021-04-25 ENCOUNTER — HEALTH MAINTENANCE LETTER (OUTPATIENT)
Age: 36
End: 2021-04-25

## 2021-10-10 ENCOUNTER — HEALTH MAINTENANCE LETTER (OUTPATIENT)
Age: 36
End: 2021-10-10

## 2022-02-17 PROBLEM — Z34.90 SUPERVISION OF NORMAL PREGNANCY: Status: RESOLVED | Noted: 2018-07-12 | Resolved: 2018-09-13

## 2022-05-21 ENCOUNTER — HEALTH MAINTENANCE LETTER (OUTPATIENT)
Age: 37
End: 2022-05-21

## 2022-09-18 ENCOUNTER — HEALTH MAINTENANCE LETTER (OUTPATIENT)
Age: 37
End: 2022-09-18

## 2023-06-04 ENCOUNTER — HEALTH MAINTENANCE LETTER (OUTPATIENT)
Age: 38
End: 2023-06-04